# Patient Record
Sex: MALE | URBAN - METROPOLITAN AREA
[De-identification: names, ages, dates, MRNs, and addresses within clinical notes are randomized per-mention and may not be internally consistent; named-entity substitution may affect disease eponyms.]

---

## 2020-01-01 ENCOUNTER — INPATIENT (INPATIENT)
Facility: HOSPITAL | Age: 85
LOS: 0 days | End: 2020-04-17
Attending: INTERNAL MEDICINE | Admitting: INTERNAL MEDICINE
Payer: MEDICARE

## 2020-01-01 VITALS
OXYGEN SATURATION: 99 % | DIASTOLIC BLOOD PRESSURE: 57 MMHG | SYSTOLIC BLOOD PRESSURE: 115 MMHG | HEART RATE: 80 BPM | RESPIRATION RATE: 24 BRPM

## 2020-01-01 VITALS
SYSTOLIC BLOOD PRESSURE: 136 MMHG | OXYGEN SATURATION: 88 % | HEART RATE: 86 BPM | RESPIRATION RATE: 26 BRPM | DIASTOLIC BLOOD PRESSURE: 74 MMHG

## 2020-01-01 DIAGNOSIS — E11.649 TYPE 2 DIABETES MELLITUS WITH HYPOGLYCEMIA WITHOUT COMA: ICD-10-CM

## 2020-01-01 DIAGNOSIS — D72.810 LYMPHOCYTOPENIA: ICD-10-CM

## 2020-01-01 DIAGNOSIS — N17.9 ACUTE KIDNEY FAILURE, UNSPECIFIED: ICD-10-CM

## 2020-01-01 DIAGNOSIS — J12.89 OTHER VIRAL PNEUMONIA: ICD-10-CM

## 2020-01-01 DIAGNOSIS — J96.01 ACUTE RESPIRATORY FAILURE WITH HYPOXIA: ICD-10-CM

## 2020-01-01 DIAGNOSIS — G93.41 METABOLIC ENCEPHALOPATHY: ICD-10-CM

## 2020-01-01 DIAGNOSIS — Z51.5 ENCOUNTER FOR PALLIATIVE CARE: ICD-10-CM

## 2020-01-01 DIAGNOSIS — J96.02 ACUTE RESPIRATORY FAILURE WITH HYPERCAPNIA: ICD-10-CM

## 2020-01-01 DIAGNOSIS — Z66 DO NOT RESUSCITATE: ICD-10-CM

## 2020-01-01 DIAGNOSIS — E87.5 HYPERKALEMIA: ICD-10-CM

## 2020-01-01 DIAGNOSIS — U07.1 COVID-19: ICD-10-CM

## 2020-01-01 DIAGNOSIS — F20.9 SCHIZOPHRENIA, UNSPECIFIED: ICD-10-CM

## 2020-01-01 DIAGNOSIS — A41.89 OTHER SPECIFIED SEPSIS: ICD-10-CM

## 2020-01-01 DIAGNOSIS — J93.11 PRIMARY SPONTANEOUS PNEUMOTHORAX: ICD-10-CM

## 2020-01-01 LAB
ACANTHOCYTES BLD QL SMEAR: SLIGHT — SIGNIFICANT CHANGE UP
ALBUMIN SERPL ELPH-MCNC: 2.6 G/DL — LOW (ref 3.5–5.2)
ALP SERPL-CCNC: 49 U/L — SIGNIFICANT CHANGE UP (ref 30–115)
ALT FLD-CCNC: 21 U/L — SIGNIFICANT CHANGE UP (ref 0–41)
ANION GAP SERPL CALC-SCNC: 38 MMOL/L — HIGH (ref 7–14)
APTT BLD: 59.6 SEC — HIGH (ref 27–39.2)
AST SERPL-CCNC: 71 U/L — HIGH (ref 0–41)
BASE EXCESS BLDV CALC-SCNC: -25.9 MMOL/L — LOW (ref -2–2)
BASOPHILS # BLD AUTO: 0 K/UL — SIGNIFICANT CHANGE UP (ref 0–0.2)
BASOPHILS NFR BLD AUTO: 0 % — SIGNIFICANT CHANGE UP (ref 0–1)
BILIRUB SERPL-MCNC: 1.2 MG/DL — SIGNIFICANT CHANGE UP (ref 0.2–1.2)
BUN SERPL-MCNC: 196 MG/DL — CRITICAL HIGH (ref 10–20)
BURR CELLS BLD QL SMEAR: PRESENT — SIGNIFICANT CHANGE UP
CA-I SERPL-SCNC: 1.2 MMOL/L — SIGNIFICANT CHANGE UP (ref 1.12–1.3)
CALCIUM SERPL-MCNC: 9 MG/DL — SIGNIFICANT CHANGE UP (ref 8.5–10.1)
CHLORIDE SERPL-SCNC: 115 MMOL/L — HIGH (ref 98–110)
CK SERPL-CCNC: 1469 U/L — HIGH (ref 0–225)
CO2 SERPL-SCNC: 6 MMOL/L — CRITICAL LOW (ref 17–32)
CREAT SERPL-MCNC: 10.5 MG/DL — CRITICAL HIGH (ref 0.7–1.5)
D DIMER BLD IA.RAPID-MCNC: 1654 NG/ML DDU — HIGH (ref 0–230)
EOSINOPHIL # BLD AUTO: 0.1 K/UL — SIGNIFICANT CHANGE UP (ref 0–0.7)
EOSINOPHIL NFR BLD AUTO: 0.9 % — SIGNIFICANT CHANGE UP (ref 0–8)
FIBRINOGEN PPP-MCNC: 261 MG/DL — SIGNIFICANT CHANGE UP (ref 204.4–570.6)
GAS PNL BLDV: 161 MMOL/L — HIGH (ref 136–145)
GAS PNL BLDV: SIGNIFICANT CHANGE UP
GIANT PLATELETS BLD QL SMEAR: PRESENT — SIGNIFICANT CHANGE UP
GLUCOSE SERPL-MCNC: 1216 MG/DL — CRITICAL HIGH (ref 70–99)
HCO3 BLDV-SCNC: 6 MMOL/L — LOW (ref 22–29)
HCT VFR BLD CALC: 36.8 % — LOW (ref 42–52)
HCT VFR BLDA CALC: 25.3 % — LOW (ref 34–44)
HGB BLD CALC-MCNC: 8.2 G/DL — LOW (ref 14–18)
HGB BLD-MCNC: 10.2 G/DL — LOW (ref 14–18)
INR BLD: 1.73 RATIO — HIGH (ref 0.65–1.3)
LACTATE BLDV-MCNC: 16 MMOL/L — HIGH (ref 0.5–1.6)
LACTATE SERPL-SCNC: 17.9 MMOL/L — CRITICAL HIGH (ref 0.7–2)
LDH SERPL L TO P-CCNC: 418 U/L — HIGH (ref 50–242)
LYMPHOCYTES # BLD AUTO: 0.61 K/UL — LOW (ref 1.2–3.4)
LYMPHOCYTES # BLD AUTO: 5.6 % — LOW (ref 20.5–51.1)
MANUAL SMEAR VERIFICATION: SIGNIFICANT CHANGE UP
MCHC RBC-ENTMCNC: 27.7 G/DL — LOW (ref 32–37)
MCHC RBC-ENTMCNC: 31.5 PG — HIGH (ref 27–31)
MCV RBC AUTO: 113.6 FL — HIGH (ref 80–94)
METAMYELOCYTES # FLD: 0.9 % — HIGH (ref 0–0)
MONOCYTES # BLD AUTO: 0.61 K/UL — HIGH (ref 0.1–0.6)
MONOCYTES NFR BLD AUTO: 5.6 % — SIGNIFICANT CHANGE UP (ref 1.7–9.3)
MYELOCYTES NFR BLD: 1.9 % — HIGH (ref 0–0)
NEUTROPHILS # BLD AUTO: 8.74 K/UL — HIGH (ref 1.4–6.5)
NEUTROPHILS NFR BLD AUTO: 79.6 % — HIGH (ref 42.2–75.2)
NEUTS BAND # BLD: 0.9 % — SIGNIFICANT CHANGE UP (ref 0–6)
NRBC # BLD: 4 /100 — HIGH (ref 0–0)
NRBC # BLD: SIGNIFICANT CHANGE UP /100 WBCS (ref 0–0)
NT-PROBNP SERPL-SCNC: 538 PG/ML — HIGH (ref 0–300)
PCO2 BLDV: 29 MMHG — LOW (ref 41–51)
PH BLDV: 6.89 — LOW (ref 7.26–7.43)
PLAT MORPH BLD: NORMAL — SIGNIFICANT CHANGE UP
PLATELET # BLD AUTO: 218 K/UL — SIGNIFICANT CHANGE UP (ref 130–400)
PO2 BLDV: 176 MMHG — HIGH (ref 20–40)
POIKILOCYTOSIS BLD QL AUTO: SIGNIFICANT CHANGE UP
POTASSIUM BLDV-SCNC: 6.6 MMOL/L — HIGH (ref 3.3–5.6)
POTASSIUM SERPL-MCNC: 6.9 MMOL/L — CRITICAL HIGH (ref 3.5–5)
POTASSIUM SERPL-SCNC: 6.9 MMOL/L — CRITICAL HIGH (ref 3.5–5)
PROMYELOCYTES # FLD: 0.9 % — HIGH (ref 0–0)
PROT SERPL-MCNC: 6.1 G/DL — SIGNIFICANT CHANGE UP (ref 6–8)
PROTHROM AB SERPL-ACNC: 19.9 SEC — HIGH (ref 9.95–12.87)
RBC # BLD: 3.24 M/UL — LOW (ref 4.7–6.1)
RBC # FLD: 13.5 % — SIGNIFICANT CHANGE UP (ref 11.5–14.5)
RBC BLD AUTO: SIGNIFICANT CHANGE UP
SAO2 % BLDV: 98 % — SIGNIFICANT CHANGE UP
SODIUM SERPL-SCNC: 159 MMOL/L — HIGH (ref 135–146)
TROPONIN T SERPL-MCNC: 0.09 NG/ML — CRITICAL HIGH
VARIANT LYMPHS # BLD: 3.7 % — SIGNIFICANT CHANGE UP (ref 0–5)
WBC # BLD: 10.86 K/UL — HIGH (ref 4.8–10.8)
WBC # FLD AUTO: 10.86 K/UL — HIGH (ref 4.8–10.8)

## 2020-01-01 PROCEDURE — 93010 ELECTROCARDIOGRAM REPORT: CPT

## 2020-01-01 PROCEDURE — 99223 1ST HOSP IP/OBS HIGH 75: CPT | Mod: AI

## 2020-01-01 PROCEDURE — 71045 X-RAY EXAM CHEST 1 VIEW: CPT | Mod: 26,76

## 2020-01-01 PROCEDURE — 36556 INSERT NON-TUNNEL CV CATH: CPT | Mod: 59

## 2020-01-01 PROCEDURE — 99291 CRITICAL CARE FIRST HOUR: CPT | Mod: 25

## 2020-01-01 PROCEDURE — 32551 INSERTION OF CHEST TUBE: CPT

## 2020-01-01 RX ORDER — MEROPENEM 1 G/30ML
1000 INJECTION INTRAVENOUS ONCE
Refills: 0 | Status: COMPLETED | OUTPATIENT
Start: 2020-01-01 | End: 2020-01-01

## 2020-01-01 RX ORDER — SODIUM CHLORIDE 9 MG/ML
1000 INJECTION, SOLUTION INTRAVENOUS
Refills: 0 | Status: DISCONTINUED | OUTPATIENT
Start: 2020-01-01 | End: 2020-01-01

## 2020-01-01 RX ORDER — MORPHINE SULFATE 50 MG/1
2 CAPSULE, EXTENDED RELEASE ORAL
Qty: 100 | Refills: 0 | Status: DISCONTINUED | OUTPATIENT
Start: 2020-01-01 | End: 2020-01-01

## 2020-01-01 RX ORDER — DEXTROSE 50 % IN WATER 50 %
50 SYRINGE (ML) INTRAVENOUS ONCE
Refills: 0 | Status: COMPLETED | OUTPATIENT
Start: 2020-01-01 | End: 2020-01-01

## 2020-01-01 RX ORDER — OCTREOTIDE ACETATE 200 UG/ML
50 INJECTION, SOLUTION INTRAVENOUS; SUBCUTANEOUS ONCE
Refills: 0 | Status: COMPLETED | OUTPATIENT
Start: 2020-01-01 | End: 2020-01-01

## 2020-01-01 RX ORDER — SODIUM CHLORIDE 9 MG/ML
1000 INJECTION INTRAMUSCULAR; INTRAVENOUS; SUBCUTANEOUS ONCE
Refills: 0 | Status: COMPLETED | OUTPATIENT
Start: 2020-01-01 | End: 2020-01-01

## 2020-01-01 RX ORDER — OCTREOTIDE ACETATE 200 UG/ML
80 INJECTION, SOLUTION INTRAVENOUS; SUBCUTANEOUS ONCE
Refills: 0 | Status: DISCONTINUED | OUTPATIENT
Start: 2020-01-01 | End: 2020-01-01

## 2020-01-01 RX ORDER — DEXTROSE 50 % IN WATER 50 %
100 SYRINGE (ML) INTRAVENOUS ONCE
Refills: 0 | Status: COMPLETED | OUTPATIENT
Start: 2020-01-01 | End: 2020-01-01

## 2020-01-01 RX ORDER — SODIUM BICARBONATE 1 MEQ/ML
150 SYRINGE (ML) INTRAVENOUS ONCE
Refills: 0 | Status: COMPLETED | OUTPATIENT
Start: 2020-01-01 | End: 2020-01-01

## 2020-01-01 RX ORDER — INSULIN HUMAN 100 [IU]/ML
10 INJECTION, SOLUTION SUBCUTANEOUS ONCE
Refills: 0 | Status: COMPLETED | OUTPATIENT
Start: 2020-01-01 | End: 2020-01-01

## 2020-01-01 RX ORDER — CALCIUM CHLORIDE
1000 POWDER (GRAM) MISCELLANEOUS ONCE
Refills: 0 | Status: COMPLETED | OUTPATIENT
Start: 2020-01-01 | End: 2020-01-01

## 2020-01-01 RX ORDER — MORPHINE SULFATE 50 MG/1
8 CAPSULE, EXTENDED RELEASE ORAL ONCE
Refills: 0 | Status: DISCONTINUED | OUTPATIENT
Start: 2020-01-01 | End: 2020-01-01

## 2020-01-01 RX ADMIN — Medication 150 MILLIEQUIVALENT(S): at 15:30

## 2020-01-01 RX ADMIN — MEROPENEM 100 MILLIGRAM(S): 1 INJECTION INTRAVENOUS at 16:55

## 2020-01-01 RX ADMIN — MORPHINE SULFATE 2 MG/HR: 50 CAPSULE, EXTENDED RELEASE ORAL at 18:05

## 2020-01-01 RX ADMIN — SODIUM CHLORIDE 75 MILLILITER(S): 9 INJECTION, SOLUTION INTRAVENOUS at 13:18

## 2020-01-01 RX ADMIN — Medication 50 MILLILITER(S): at 12:45

## 2020-01-01 RX ADMIN — Medication 1000 MILLIGRAM(S): at 15:30

## 2020-01-01 RX ADMIN — MORPHINE SULFATE 8 MILLIGRAM(S): 50 CAPSULE, EXTENDED RELEASE ORAL at 16:33

## 2020-01-01 RX ADMIN — OCTREOTIDE ACETATE 50 MICROGRAM(S): 200 INJECTION, SOLUTION INTRAVENOUS; SUBCUTANEOUS at 13:00

## 2020-01-01 RX ADMIN — INSULIN HUMAN 10 UNIT(S): 100 INJECTION, SOLUTION SUBCUTANEOUS at 15:30

## 2020-01-01 RX ADMIN — SODIUM CHLORIDE 1000 MILLILITER(S): 9 INJECTION INTRAMUSCULAR; INTRAVENOUS; SUBCUTANEOUS at 13:00

## 2020-04-17 NOTE — H&P ADULT - HISTORY OF PRESENT ILLNESS
Patient is a 89 yo male schizophrenia, DM, HTN presenting with altered mental status. Found to have fingerstick of 10 in ED and given two D50 pushes. Lab work on admission showed WBC of 10,000, D Dimer > 1600, K 6.9 non hemolyzed (with peaked T waves and a wide QRS on EKG) and lactate of 17.9. Patient's daughter was contacted by ED and decision was made to make the patient DNR and DNI with limited medical intervention. Therefore, L pigtail placed. However, I spoke with both the patient's daughter and cousin. Given that patient is saturating 75% on NRB, they were in agreement to allow for comfort measures only. Patient to be placed on morphine drip to allow for comfortable respirations.

## 2020-04-17 NOTE — DISCHARGE NOTE FOR THE EXPIRED PATIENT - HOSPITAL COURSE
Patient was admitted with altered mental status, found to be hypotensive, hypoglycemic, hypoxic. Blood work showed kidney failure + hyperkalemia. Chest Xray showed pneumothorax. COVID-19 pneumonia was suspected, patient started on NRB mask. Family contacted, decision was made to make patient DNR/DNI, family infoemd about very poor prognosis, patient started on on morphine drip. Patient was placed on NRB mask, and given IV fluids. Left chest tube placed. Patient was found pulseless at 19:24.

## 2020-04-17 NOTE — DISCHARGE NOTE FOR THE EXPIRED PATIENT - SECONDARY DIAGNOSIS.
Hypoglycemia Primary spontaneous pneumothorax Acute renal failure, unspecified acute renal failure type

## 2020-04-17 NOTE — H&P ADULT - ASSESSMENT
Patient is a 89 yo male schizophrenia, DM, HTN presenting with altered mental status. Family aware of grave prognosis and agreeable to comfort measures only.    #) Patient is a 89 yo male schizophrenia, DM, HTN presenting with altered mental status. Family aware of grave prognosis and agreeable to comfort measures only.    #) AMS with Multi organ failure with suspicion for underlying COVID 19  - Patient with profound hypoglycemia and multiorgan failure   - Family agreeable to DNR/DNI with comfort measures only  - Patient given morphine push and started on Morphine drip   - Please notify Lucita Tierneymonds Robinson [phone] upon patient's passing; (Daughter Melissa Bird as well at [phone]) but she lives out of state)     CODE: DNR/DNI with comfort measures only

## 2020-04-17 NOTE — ED PROVIDER NOTE - CLINICAL SUMMARY MEDICAL DECISION MAKING FREE TEXT BOX
Pt w seevere sepsis, hypoglycemia, COVID suspected. IVF and IV abx started. CVC placed. Contacted family- family requesting comfort measures given grim prognosis.

## 2020-04-17 NOTE — ED PROVIDER NOTE - OBJECTIVE STATEMENT
89 yo male presenting with altered mental status. Found to have fingerstick of 10 in ED and given two D50 pushes and started on D10 drip. R femoral central line placed. 89 yo male schizophrenia, diabetes, htn presenting with altered mental status. Found to have fingerstick of 10 in ED and given two D50 pushes. R femoral central line placed. found to have L pneumothorax and pigtail placed. ekg showed wide qrs and peaked t waves, potassium 6.9 and lactate 17.9. discussed with daughter who made him dnr/dni.

## 2020-04-17 NOTE — ED PROVIDER NOTE - PHYSICAL EXAMINATION
CONSTITUTIONAL: Well-developed  SKIN: warm, dry  HEAD: Normocephalic; atraumatic.  EYES: PERRL, EOMI, normal sclera and conjunctiva   ENT: No nasal discharge; airway clear.  NECK: Supple; non tender.  CARD: tachycardia  RESP: b/l crackles, tachypnea   ABD: soft ntnd  EXT: Normal ROM.  No clubbing, cyanosis or edema.   LYMPH: No acute cervical adenopathy.  NEURO: lethargic  PSYCH: not answering questions appropriately

## 2020-04-17 NOTE — H&P ADULT - NSHPLABSRESULTS_GEN_ALL_CORE
10.2   10.86  )----------(  218       ( 17 Apr 2020 12:06 )               36.8    04-17    159<H>  |  115<H>  |  196<HH>  ----------------------------<  1216<HH>  6.9<HH>   |  6<LL>  |  10.5<HH>    Ca    9.0      17 Apr 2020 13:20    TPro  6.1  /  Alb  2.6<L>  /  TBili  1.2  /  DBili  x   /  AST  71<H>  /  ALT  21  /  AlkPhos  49  04-17    CARDIAC MARKERS ( 17 Apr 2020 13:20 )  x     / 0.09 ng/mL / 1469 U/L / x     / x

## 2020-04-17 NOTE — ED ADULT NURSE NOTE - OBJECTIVE STATEMENT
Pt brought in for respiratory distress. Pt is nonverbal at time. As per EMS, fs on scene was 44 and given glucose.

## 2020-04-17 NOTE — CONSULT NOTE ADULT - ASSESSMENT
Assessment:    Acute hypoxemic respiratory failure secondary to SARS-COV-2 infection   severe  hypoglycemia  Severe REBEKAH  Hyperkalemia      PLAN:    morphine for dyspnea  DNR/DNI  Not candidate for ICU  Palliative care

## 2020-04-17 NOTE — ED PROVIDER NOTE - PROGRESS NOTE DETAILS
Spoke w the daughter. Aware of the extremely poor prognosis. Labs pending. Central line placed. Daughter wants patient to be DNR/DNI. Daughter aware of grim prognosis. Left pigtail catheter inserted for apical pneumothorax. Pt w seevere sepsis, hypoglycemia, COVID suspected. IVF and IV abx started. CVC placed. Contacted family- family requesting comfort measures given grim prognosis.

## 2020-04-17 NOTE — CONSULT NOTE ADULT - SUBJECTIVE AND OBJECTIVE BOX
Patient is a 89y old  Male who presents with a chief complaint of     HPI:89 yo male schizophrenia, diabetes, htn presenting with altered mental status. Found to have fingerstick of 10 in ED and given two D50 pushes. R femoral central line placed. found to have L pneumothorax and pigtail placed. ekg showed wide qrs and peaked t waves, potassium 6.9 and lactate 17.9. discussed with daughter who made him dnr/dni.      PAST MEDICAL & SURGICAL HISTORY:      SOCIAL HX:   Smoking     unkown                    ETOH       -ve                     Other  -ve    FAMILY HISTORY:  :  No known cardiovacular family hisotry     ROS:  See HPI     Allergies    No Known Allergies    Intolerances          PHYSICAL EXAM    ICU Vital Signs Last 24 Hrs  T(C): 34.8 (17 Apr 2020 13:59), Max: 34.8 (17 Apr 2020 13:59)  T(F): 94.6 (17 Apr 2020 13:59), Max: 94.6 (17 Apr 2020 13:59)  HR: 80 (17 Apr 2020 15:34) (80 - 96)  BP: 115/57 (17 Apr 2020 15:34) (79/44 - 142/55)  BP(mean): --  ABP: --  ABP(mean): --  RR: 24 (17 Apr 2020 15:34) (24 - 26)  SpO2: 70% on NRM (17 Apr 2020 15:34) (84% - 99%)      General: In severe distress  HEENT:  CARMELA              Lungs: Bilateral crackles  Cardiovascular: Regular  Gastrointestinal: Soft, Positive BS  Musculoskeletal: No clubbing.  Moves all extremities.  Full range of motion   Skin: Warm.  Intact  Neurological: No motor or sensory deficit         LABS:                          10.2   10.86 )-----------( 218      ( 17 Apr 2020 12:06 )             36.8                                               04-17    159<H>  |  115<H>  |  196<HH>  ----------------------------<  1216<HH>  6.9<HH>   |  6<LL>  |  10.5<HH>    Ca    9.0      17 Apr 2020 13:20    TPro  6.1  /  Alb  2.6<L>  /  TBili  1.2  /  DBili  x   /  AST  71<H>  /  ALT  21  /  AlkPhos  49  04-17      PT/INR - ( 17 Apr 2020 13:20 )   PT: 19.90 sec;   INR: 1.73 ratio         PTT - ( 17 Apr 2020 13:20 )  PTT:59.6 sec                                           CARDIAC MARKERS ( 17 Apr 2020 13:20 )  x     / 0.09 ng/mL / 1469 U/L / x     / x                                                LIVER FUNCTIONS - ( 17 Apr 2020 13:20 )  Alb: 2.6 g/dL / Pro: 6.1 g/dL / ALK PHOS: 49 U/L / ALT: 21 U/L / AST: 71 U/L / GGT: x                                                                                                                                       X-Rays                                                                                     ECHO    MEDICATIONS  (STANDING):  dextrose 10% + sodium chloride 0.9%. 1000 milliLiter(s) (50 mL/Hr) IV Continuous <Continuous>  meropenem  IVPB 1000 milliGRAM(s) IV Intermittent Once  morphine  - Injectable 8 milliGRAM(s) IV Push Once    MEDICATIONS  (PRN): Patient is a 89y old  Male who presents with a chief complaint of     HPI:89 yo male schizophrenia, diabetes, htn presenting with altered mental status. Found to have fingerstick of 10 in ED and given two D50 pushes. R femoral central line placed. found to have L pneumothorax and pigtail placed. ekg showed wide qrs and peaked t waves, potassium 6.9 and lactate 17.9. discussed with daughter who made him dnr/dni.      PAST MEDICAL & SURGICAL HISTORY:      SOCIAL HX:   Smoking     unkown                    ETOH       -ve                     Other  -ve    FAMILY HISTORY:  :  No known cardiovacular family hisotry     ROS:  See HPI     Allergies    No Known Allergies    Intolerances          PHYSICAL EXAM    ICU Vital Signs Last 24 Hrs  T(C): 34.8 (17 Apr 2020 13:59), Max: 34.8 (17 Apr 2020 13:59)  T(F): 94.6 (17 Apr 2020 13:59), Max: 94.6 (17 Apr 2020 13:59)  HR: 80 (17 Apr 2020 15:34) (80 - 96)  BP: 115/57 (17 Apr 2020 15:34) (79/44 - 142/55)  BP(mean): --  ABP: --  ABP(mean): --  RR: 24 (17 Apr 2020 15:34) (24 - 26)  SpO2: 70% on NRM (17 Apr 2020 15:34) (84% - 99%)      General: In severe distress  HEENT:  CARMELA              Lungs: Bilateral crackles  Cardiovascular: Regular  Gastrointestinal: Soft, Positive BS  Musculoskeletal: No clubbing.    Skin: Warm.  Intact  Neurological: disoriented, altered        LABS:                          10.2   10.86 )-----------( 218      ( 17 Apr 2020 12:06 )             36.8                                               04-17    159<H>  |  115<H>  |  196<HH>  ----------------------------<  1216<HH>  6.9<HH>   |  6<LL>  |  10.5<HH>    Ca    9.0      17 Apr 2020 13:20    TPro  6.1  /  Alb  2.6<L>  /  TBili  1.2  /  DBili  x   /  AST  71<H>  /  ALT  21  /  AlkPhos  49  04-17      PT/INR - ( 17 Apr 2020 13:20 )   PT: 19.90 sec;   INR: 1.73 ratio         PTT - ( 17 Apr 2020 13:20 )  PTT:59.6 sec                                           CARDIAC MARKERS ( 17 Apr 2020 13:20 )  x     / 0.09 ng/mL / 1469 U/L / x     / x                                                LIVER FUNCTIONS - ( 17 Apr 2020 13:20 )  Alb: 2.6 g/dL / Pro: 6.1 g/dL / ALK PHOS: 49 U/L / ALT: 21 U/L / AST: 71 U/L / GGT: x                                                                                                                                       X-Rays                                                                                     ECHO    MEDICATIONS  (STANDING):  dextrose 10% + sodium chloride 0.9%. 1000 milliLiter(s) (50 mL/Hr) IV Continuous <Continuous>  meropenem  IVPB 1000 milliGRAM(s) IV Intermittent Once  morphine  - Injectable 8 milliGRAM(s) IV Push Once    MEDICATIONS  (PRN):

## 2020-04-17 NOTE — H&P ADULT - ATTENDING COMMENTS
89 YO M with a PMH of schizophrenia, DM, and HTN who presented to the hospital with a c/o AMS for the past x 1 day. In the ED, found to be hypotensive, hypoglycemic, hypoxic. Blood work showed kidney failure + hyperkalemia. Chest Xray showed apical pneumothorax and chest tube placed. COVID-19 pneumonia was suspected, patient started on NRB mask. The ED staff reached out to the pt's family and they decided to make the pt DNR/DNI with CMO. The pt was placed on a Morphine drip at that time and admitted for palliative evaluation.     Please see above for physical exam.     Metabolic encephalopathy due to likely COVID19 infection and acute hypoxic respiratory failure. Pt made CMO by his family and started on Morphine drip. The pt  at 1924. Family updated.

## 2020-04-18 LAB
CRP SERPL-MCNC: 14.43 MG/DL — HIGH (ref 0–0.4)
PROCALCITONIN SERPL-MCNC: 1.11 NG/ML — HIGH (ref 0.02–0.1)
SARS-COV-2 RNA SPEC QL NAA+PROBE: SIGNIFICANT CHANGE UP

## 2020-04-19 LAB
-  COAGULASE NEGATIVE STAPHYLOCOCCUS: SIGNIFICANT CHANGE UP
CULTURE RESULTS: SIGNIFICANT CHANGE UP
FERRITIN SERPL-MCNC: 8420 NG/ML — HIGH (ref 30–400)
GRAM STN FLD: SIGNIFICANT CHANGE UP
GRAM STN FLD: SIGNIFICANT CHANGE UP
METHOD TYPE: SIGNIFICANT CHANGE UP
ORGANISM # SPEC MICROSCOPIC CNT: SIGNIFICANT CHANGE UP
ORGANISM # SPEC MICROSCOPIC CNT: SIGNIFICANT CHANGE UP
SPECIMEN SOURCE: SIGNIFICANT CHANGE UP

## 2020-05-26 NOTE — CHART NOTE - NSCHARTNOTEFT_GEN_A_CORE
I, Lacie Leal MD attest that, to the best of my knowledge based on clinical presentation and findings documented in the medical record, this patient had pneumonia and sepsis due to COVID-19 infection despite a negative PCR, as evidenced by lymphopenia and elevated D-Dimer, CRP, ferritin, and LDH.

## 2020-06-03 LAB
CULTURE RESULTS: SIGNIFICANT CHANGE UP
SPECIMEN SOURCE: SIGNIFICANT CHANGE UP

## 2023-01-17 NOTE — ED PROCEDURE NOTE - CPROC ED INFUS LINE DETAIL1
The location was identified, and the area was draped and prepped. PAST SURGICAL HISTORY:  H/O umbilical hernia repair     History of appendectomy     S/P  2019